# Patient Record
Sex: FEMALE | Race: WHITE | Employment: FULL TIME | URBAN - METROPOLITAN AREA
[De-identification: names, ages, dates, MRNs, and addresses within clinical notes are randomized per-mention and may not be internally consistent; named-entity substitution may affect disease eponyms.]

---

## 2017-04-22 DIAGNOSIS — G43.009 MIGRAINE WITHOUT AURA AND WITHOUT STATUS MIGRAINOSUS, NOT INTRACTABLE: ICD-10-CM

## 2017-04-25 RX ORDER — TOPIRAMATE 200 MG/1
TABLET ORAL
Qty: 90 TAB | Refills: 3 | OUTPATIENT
Start: 2017-04-25

## 2017-05-18 ENCOUNTER — TELEPHONE (OUTPATIENT)
Dept: FAMILY MEDICINE CLINIC | Age: 52
End: 2017-05-18

## 2017-05-18 NOTE — TELEPHONE ENCOUNTER
Patient is requesting (New  Updated) referral to the following office:    Speciality: GYN (Dr. Barbara Garrett has changed her schedule and therefore she would like to be recommended to someone else, prefers a woman)  Specialist Name: female gyn (willing to go to VONNIE Ray, or Cyndi jarrell)  Office Location:   Phone (if available):   Fax (if available):   Diagnosis:   Date of appointment (if scheduled): she just needs someone who can get her in before 6/15 b/c of poss job and insurance change

## 2017-05-23 NOTE — TELEPHONE ENCOUNTER
Called patient back to let her know I will send a consult request to [facility] no answer left message asking patient to call the office back. Consult request has been faxed to [facility].

## 2017-05-23 NOTE — TELEPHONE ENCOUNTER
Patient called the office back she has been told a consult request has been faxed to [facility] and someone from that office should contact her to set up an appointment asked that she call the office if she has not heard from anyone by next Friday patient expressed concern about getting in to be seen before she loses her insurance in June she has been given their telephone number asked to call them to see when they can get her in and if they are not able to get her in before the desired date to call me back and I will look for another GYN that can hopefully see her sooner she has expressed understanding.

## 2017-12-01 ENCOUNTER — OFFICE VISIT (OUTPATIENT)
Dept: FAMILY MEDICINE CLINIC | Age: 52
End: 2017-12-01

## 2017-12-01 ENCOUNTER — HOSPITAL ENCOUNTER (OUTPATIENT)
Dept: LAB | Age: 52
Discharge: HOME | End: 2017-12-01
Payer: SELF-PAY

## 2017-12-01 ENCOUNTER — TELEPHONE (OUTPATIENT)
Dept: FAMILY MEDICINE CLINIC | Age: 52
End: 2017-12-01

## 2017-12-01 VITALS
HEART RATE: 71 BPM | OXYGEN SATURATION: 99 % | BODY MASS INDEX: 26.36 KG/M2 | HEIGHT: 66 IN | SYSTOLIC BLOOD PRESSURE: 118 MMHG | RESPIRATION RATE: 15 BRPM | WEIGHT: 164 LBS | TEMPERATURE: 97.7 F | DIASTOLIC BLOOD PRESSURE: 72 MMHG

## 2017-12-01 DIAGNOSIS — E66.3 OVERWEIGHT: ICD-10-CM

## 2017-12-01 DIAGNOSIS — Z13.220 SCREENING, LIPID: ICD-10-CM

## 2017-12-01 DIAGNOSIS — G43.009 MIGRAINE WITHOUT AURA AND WITHOUT STATUS MIGRAINOSUS, NOT INTRACTABLE: Primary | ICD-10-CM

## 2017-12-01 DIAGNOSIS — Z13.1 SCREENING FOR DIABETES MELLITUS: ICD-10-CM

## 2017-12-01 DIAGNOSIS — Z12.39 SCREENING FOR BREAST CANCER: Primary | ICD-10-CM

## 2017-12-01 LAB
ALBUMIN SERPL-MCNC: 4.2 G/DL (ref 3.4–5)
ALBUMIN/GLOB SERPL: 1.3 {RATIO} (ref 0.8–1.7)
ALP SERPL-CCNC: 64 U/L (ref 45–117)
ALT SERPL-CCNC: 22 U/L (ref 13–56)
ANION GAP SERPL CALC-SCNC: 10 MMOL/L (ref 3–18)
AST SERPL-CCNC: 15 U/L (ref 15–37)
BILIRUB SERPL-MCNC: 0.5 MG/DL (ref 0.2–1)
BUN SERPL-MCNC: 20 MG/DL (ref 7–18)
BUN/CREAT SERPL: 29 (ref 12–20)
CALCIUM SERPL-MCNC: 9.2 MG/DL (ref 8.5–10.1)
CHLORIDE SERPL-SCNC: 105 MMOL/L (ref 100–108)
CHOLEST SERPL-MCNC: 223 MG/DL
CO2 SERPL-SCNC: 26 MMOL/L (ref 21–32)
CREAT SERPL-MCNC: 0.7 MG/DL (ref 0.6–1.3)
EST. AVERAGE GLUCOSE BLD GHB EST-MCNC: 114 MG/DL
GLOBULIN SER CALC-MCNC: 3.3 G/DL (ref 2–4)
GLUCOSE SERPL-MCNC: 74 MG/DL (ref 74–99)
HBA1C MFR BLD: 5.6 % (ref 4.2–5.6)
HDLC SERPL-MCNC: 66 MG/DL (ref 40–60)
HDLC SERPL: 3.4 {RATIO} (ref 0–5)
LDLC SERPL CALC-MCNC: 137.2 MG/DL (ref 0–100)
LIPID PROFILE,FLP: ABNORMAL
POTASSIUM SERPL-SCNC: 4.4 MMOL/L (ref 3.5–5.5)
PROT SERPL-MCNC: 7.5 G/DL (ref 6.4–8.2)
SODIUM SERPL-SCNC: 141 MMOL/L (ref 136–145)
TRIGL SERPL-MCNC: 99 MG/DL (ref ?–150)
TSH W FREE THYROID I,TSHELE: 1.89 UIU/ML (ref 0.36–3.74)
VLDLC SERPL CALC-MCNC: 19.8 MG/DL

## 2017-12-01 PROCEDURE — 83036 HEMOGLOBIN GLYCOSYLATED A1C: CPT | Performed by: FAMILY MEDICINE

## 2017-12-01 PROCEDURE — 80053 COMPREHEN METABOLIC PANEL: CPT | Performed by: FAMILY MEDICINE

## 2017-12-01 PROCEDURE — 80061 LIPID PANEL: CPT | Performed by: FAMILY MEDICINE

## 2017-12-01 PROCEDURE — 84443 ASSAY THYROID STIM HORMONE: CPT | Performed by: FAMILY MEDICINE

## 2017-12-01 NOTE — TELEPHONE ENCOUNTER
Mrs. John Hernandez called in stating she meant to ask Dr. Shanel Thurman for a new order for a mammogram. She is requesting a 3D Mammogram. She can be reached on her mobile [phone].

## 2017-12-01 NOTE — MR AVS SNAPSHOT
Visit Information Date & Time Provider Department Dept. Phone Encounter #  
 12/1/2017  9:00 AM Ion Latif MD [facility] [phone] [id number] Follow-up Instructions Return for results via [employer] with plan to follow. Upcoming Health Maintenance Date Due  
 PAP AKA CERVICAL CYTOLOGY 5/17/2015 Influenza Age 5 to Adult 8/1/2017 BREAST CANCER SCRN MAMMOGRAM 12/22/2018 COLONOSCOPY 4/4/2024 DTaP/Tdap/Td series (2 - Td) 12/1/2027 Allergies as of 12/1/2017  Review Complete On: 12/1/2017 By: Ion Latif MD  
  
 Severity Noted Reaction Type Reactions Gluten  05/29/2015    Nausea and Vomiting Current Immunizations  Reviewed on 3/4/2016 Name Date Influenza Vaccine 9/22/2016 Influenza Vaccine (Quad) PF 10/16/2015 TB Skin Test (PPD) Intradermal 1/18/2016 Not reviewed this visit You Were Diagnosed With   
  
 Codes Comments Migraine without aura and without status migrainosus, not intractable    -  Primary ICD-10-CM: G43.009 ICD-9-CM: 346.10 Overweight     ICD-10-CM: L70.9 ICD-9-CM: 278.02 Screening for diabetes mellitus     ICD-10-CM: Z13.1 ICD-9-CM: V77.1 Screening, lipid     ICD-10-CM: X94.251 ICD-9-CM: V77.91 Vitals BP Pulse Temp Resp Height(growth percentile) Weight(growth percentile) 118/72 71 97.7 °F (36.5 °C) 15 5' 6\" (1.676 m) 164 lb (74.4 kg) LMP SpO2 BMI OB Status Smoking Status 05/14/2017 99% 26.47 kg/m2 Having regular periods Never Smoker Vitals History BMI and BSA Data Body Mass Index Body Surface Area  
 26.47 kg/m 2 1.86 m 2 Preferred Pharmacy Pharmacy Name Phone [id number], [address] Your Updated Medication List  
  
   
This list is accurate as of: 12/1/17  9:37 AM.  Always use your most recent med list.  
  
  
  
  
 calcium citrate 200 mg (950 mg) tablet Take  by mouth daily. CLARITIN 10 mg tablet Generic drug:  loratadine Take 10 mg by mouth daily. ibuprofen 200 mg tablet Commonly known as:  MOTRIN Take 600 mg by mouth every six (6) hours as needed for Pain. IRON(SALT MIX)-ESUTR-Q-EE-14MV PO Take  by mouth. VITAMIN D3 1,000 unit Cap Generic drug:  cholecalciferol Take  by mouth. Follow-up Instructions Return for results via [employer] with plan to follow. To-Do List   
 12/01/2017 Lab:  HEMOGLOBIN A1C WITH EAG   
  
 12/01/2017 Lab:  LIPID PANEL W/ REFLX DIRECT LDL Around 12/01/2017 Lab:  METABOLIC PANEL, COMPREHENSIVE   
  
 12/01/2017 Lab:  THYROID CASCADE PROFILE Introducing [facility]! Dear Carolann Mary: 
Thank you for requesting a [employer] account. Our records indicate that you already have an active [employer] account. You can access your account anytime at [email or web address] Did you know that you can access your hospital and ER discharge instructions at any time in [employer]? You can also review all of your test results from your hospital stay or ER visit. Additional Information If you have questions, please visit the Frequently Asked Questions section of the [employer] website at [email or web address] Remember, [employer] is NOT to be used for urgent needs. For medical emergencies, dial 911. Now available from your iPhone and Android! Please provide this summary of care documentation to your next provider. Your primary care clinician is listed as Raudel Arreaga. If you have any questions after today's visit, please call [phone].

## 2017-12-01 NOTE — PROGRESS NOTES
Darrin Osorio is a 47 y.o. female    Follow-up migraine headaches had been well controlled on Topamax prophylaxis. Headaches 3-4/year last year. Stable pattern. weaned herself off the topamax over a period of two months (June-Aug) without difficulty. 6/year in the past year including that time frame  Aborting them with caffeine and ibuprofen 400 mg  Headaches shorter. 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 Very happy. Last Td in the wake of an injury couldn't raise her arm for a month. \"I'll take my chances. \"    Frustrated with weight gain. Weight Watchers had been effective in the past. Not working in the past month. Patient Care Team:  Harrison Abbott MD as PCP - General (Family Practice)  Grace Hathaway MD (Obstetrics & Gynecology)  Allergies   Allergen Reactions    Gluten Nausea and Vomiting     Outpatient Prescriptions Marked as Taking for the 12/1/17 encounter (Office Visit) with Harrison Abbott MD   Medication Sig Dispense Refill    ibuprofen (MOTRIN) 200 mg tablet Take 600 mg by mouth every six (6) hours as needed for Pain.  IRON BISGLY&PS/PRABHJOT/C/FA/MV 14 (IRON,SALT MIX,-MMWPR-J-NS-14MV PO) Take  by mouth.  calcium citrate 200 mg (950 mg) tablet Take  by mouth daily.  Cholecalciferol, Vitamin D3, (VITAMIN D3) 1,000 unit cap Take  by mouth.  loratadine (CLARITIN) 10 mg tablet Take 10 mg by mouth daily.          Patient Active Problem List    Diagnosis    Overweight    Migraine without aura and without status migrainosus, not intractable    Menorrhagia    Non-celiac gluten sensitivity     Past Medical History:   Diagnosis Date    Gluten intolerance      Past Surgical History:   Procedure Laterality Date    HX GYN  11/23/2014    endometrial ablation    HX HYSTEROSCOPY      [facility] Family History   Problem Relation Age of Onset    Heart Disease Father      passed at 62     Social History     Social History    Marital status:      Spouse name: N/A    Number of children: N/A    Years of education: N/A     Occupational History          [address] History Main Topics    Smoking status: Never Smoker    Smokeless tobacco: Never Used    Alcohol use Yes      Comment: occasionally     Drug use: No    Sexual activity: Yes     Partners: Male     Other Topics Concern    Not on file     Social History Narrative         Review of Systems   Constitutional: Negative for malaise/fatigue. Gastrointestinal: Negative for abdominal pain. Neurological: Negative for sensory change, speech change, focal weakness, seizures and loss of consciousness. Visit Vitals    /72    Pulse 71    Temp 97.7 °F (36.5 °C)    Resp 15    Ht 5' 6\" (1.676 m)    Wt 164 lb (74.4 kg)    LMP 05/14/2017    SpO2 99%    BMI 26.47 kg/m2     Physical Exam   Constitutional: She is oriented to person, place, and time. She appears well-developed. No distress. Pleasant overweight white female   HENT:   Head: Normocephalic and atraumatic. Pulmonary/Chest: Effort normal.   Neurological: She is alert and oriented to person, place, and time. No focal deficits   Psychiatric: She has a normal mood and affect. Her behavior is normal. Judgment and thought content normal.         ICD-10-CM ICD-9-CM    1. Migraine without aura and without status migrainosus, not intractable G43.009 346.10    2. Overweight E66.3 278.02 THYROID CASCADE PROFILE      HEMOGLOBIN A1C WITH EAG      LIPID PANEL W/ REFLX DIRECT LDL      METABOLIC PANEL, COMPREHENSIVE   3. Screening for diabetes mellitus Z13.1 V77.1 HEMOGLOBIN A1C WITH EAG      METABOLIC PANEL, COMPREHENSIVE   4. Screening, lipid Z13.220 V77.91 LIPID PANEL W/ REFLX DIRECT LDL       Migraines are stable well controlled.   Continue present management controlling triggering allergies with antihistamines and caffeine and OTC NSAIDs for prophylaxis. follow up prn progression of symptoms. Discussed challenges of maintaining weight with age/slowing metabolism. Caloric control is the key. While exercise important, not as effective in controlling weight. Labs to assess other contributors. Flu vaccine not available in clinic. Advised to return here to have at her pharmacy. Declining Tdap. Encouraged to seek vaccination should she be injured. Obtaining pap report. The patient understands our medical plan. Alternatives have been explained and offered. The risks, benefits and significant side effects of all medications have been reviewed. All questions have been answered. She is encouraged to employ the information provided in the after visit summary, which was reviewed. She is instructed to call the clinic if she has not been notified either by phone or through [address] with the results of her tests or with an appointment plan for any referrals within 1 week(s). No news is not good news; it's no news. The patient  is to call if her condition worsens or fails to improve or if significant side effects are experienced. Follow-up Disposition:  Return for results via [employer] with plan to follow.

## 2017-12-01 NOTE — PROGRESS NOTES
Chief Complaint   Patient presents with    Migraine     f/u. Pt reports that she weaned herself off the topamax over a period of two months and is doing 'much better'. She has had hysteroscopy done June 26. 2017. 1. Have you been to the ER, urgent care clinic since your last visit? Hospitalized since your last visit? No    2. Have you seen or consulted any other health care providers outside of the [facility] since your last visit? Include any pap smears or colon screening. Care team updated. Dr. Joe Fernandez did hysteroscopy.

## 2017-12-28 ENCOUNTER — HOSPITAL ENCOUNTER (OUTPATIENT)
Dept: MAMMOGRAPHY | Age: 52
Discharge: HOME | End: 2017-12-28
Attending: FAMILY MEDICINE
Payer: COMMERCIAL

## 2017-12-28 DIAGNOSIS — Z12.39 SCREENING FOR BREAST CANCER: ICD-10-CM

## 2017-12-28 PROCEDURE — 77063 BREAST TOMOSYNTHESIS BI: CPT

## 2018-12-28 ENCOUNTER — TELEPHONE (OUTPATIENT)
Dept: FAMILY MEDICINE CLINIC | Age: 53
End: 2018-12-28

## 2018-12-28 DIAGNOSIS — Z12.39 SCREENING FOR BREAST CANCER: Primary | ICD-10-CM

## 2018-12-28 NOTE — TELEPHONE ENCOUNTER
Patient called requesting a order for a  mammogram. Please call patient at [phone] if you have any questions.

## 2019-01-03 NOTE — TELEPHONE ENCOUNTER
Please determine whether this is a request for a screening mammogram (no masses, discharge, skin changes or other concern) in which case load the order and I'll sign    Vs prompted by a breast concern, in which case a visit is needed to determine the appropriate test.     ALBIN

## 2019-01-03 NOTE — TELEPHONE ENCOUNTER
Spoke with [address]  1965 (confirmed) on 2019 the patient stated that it is just a screening (nothing is wrong) the patient requested a 3D mammo. Told patient I would rout to Dr. Jaelyn Becker and she would be updated as soon as possible. Patient expressed understanding.     SMD

## 2019-01-03 NOTE — TELEPHONE ENCOUNTER
Called [address]  1965 on 2019  Left message requesting patient to call back at earliest convenience.     LUCINA

## 2019-02-18 ENCOUNTER — HOSPITAL ENCOUNTER (OUTPATIENT)
Dept: MAMMOGRAPHY | Age: 54
Discharge: HOME | End: 2019-02-18
Attending: FAMILY MEDICINE
Payer: COMMERCIAL

## 2019-02-18 DIAGNOSIS — Z12.39 SCREENING FOR BREAST CANCER: ICD-10-CM

## 2019-02-18 DIAGNOSIS — R92.2 INCONCLUSIVE MAMMOGRAM: Primary | ICD-10-CM

## 2019-02-18 PROCEDURE — 77063 BREAST TOMOSYNTHESIS BI: CPT

## 2019-02-18 NOTE — PROGRESS NOTES
The radiologist was not able to extract enough information from the standard mammography views by which to make a complete assessment on the right breast. Additional breast images have been recommended. This should not be considered an \"abnormal\" study. It's an incomplete study. I've ordered the requested images. Please feel free to call  [facility] Scheduling [phone] if they have not already call you.  Take good care, ALBIN

## 2019-04-22 ENCOUNTER — HOSPITAL ENCOUNTER (OUTPATIENT)
Dept: ULTRASOUND IMAGING | Age: 54
Discharge: HOME | End: 2019-04-22
Attending: FAMILY MEDICINE
Payer: COMMERCIAL

## 2019-04-22 ENCOUNTER — HOSPITAL ENCOUNTER (OUTPATIENT)
Dept: MAMMOGRAPHY | Age: 54
Discharge: HOME | End: 2019-04-22
Attending: FAMILY MEDICINE
Payer: COMMERCIAL

## 2019-04-22 DIAGNOSIS — R92.2 INCONCLUSIVE MAMMOGRAM: ICD-10-CM

## 2019-04-22 PROCEDURE — 76642 ULTRASOUND BREAST LIMITED: CPT

## 2019-04-22 PROCEDURE — 77061 BREAST TOMOSYNTHESIS UNI: CPT

## 2019-04-23 NOTE — PROGRESS NOTES
Reassuring results, Yadira Jaimes (Sammye December?). Remember that no test is perfect. Plan to be seen if you should develop any breast symptoms such as pain, skin changes or masses. Take good care.  ALBIN

## 2019-10-23 ENCOUNTER — OFFICE VISIT (OUTPATIENT)
Dept: FAMILY MEDICINE CLINIC | Age: 54
End: 2019-10-23

## 2019-10-23 VITALS
OXYGEN SATURATION: 98 % | DIASTOLIC BLOOD PRESSURE: 78 MMHG | WEIGHT: 159 LBS | HEIGHT: 66 IN | HEART RATE: 78 BPM | TEMPERATURE: 97.8 F | BODY MASS INDEX: 25.55 KG/M2 | SYSTOLIC BLOOD PRESSURE: 104 MMHG | RESPIRATION RATE: 12 BRPM

## 2019-10-23 DIAGNOSIS — M79.671 PAIN OF BOTH HEELS: ICD-10-CM

## 2019-10-23 DIAGNOSIS — M79.672 PAIN OF BOTH HEELS: ICD-10-CM

## 2019-10-23 DIAGNOSIS — L98.9 SKIN LESION: Primary | ICD-10-CM

## 2019-10-23 RX ORDER — IBUPROFEN 800 MG/1
800 TABLET ORAL
Qty: 60 TAB | Refills: 1 | Status: SHIPPED | OUTPATIENT
Start: 2019-10-23

## 2019-10-23 NOTE — PATIENT INSTRUCTIONS
Heel Pain: Care Instructions  Your Care Instructions  You can have heel pain from an injury or from everyday overuse, such as running or walking a lot. Plantar fasciitis is the most common cause of heel pain. In this condition, the bottom of your foot from the front of the heel to the base of the toes is sore and hard to walk on. Your heel can get better with rest, anti-inflammatory pain medicines, and stretching exercises. Follow-up care is a key part of your treatment and safety. Be sure to make and go to all appointments, and call your doctor if you are having problems. It's also a good idea to know your test results and keep a list of the medicines you take. How can you care for yourself at home? · Rest your feet often. Reduce your activity to a level that lets you avoid pain. If possible, do not run or walk on hard surfaces. · Take anti-inflammatory medicines to reduce heel pain. These include ibuprofen (Advil, Motrin) and naproxen (Aleve). Read and follow all instructions on the label. · Put ice or a cold pack on your heel for 10 to 20 minutes at a time. Try to do this every 1 to 2 hours for the next 3 days (when you are awake). Put a thin cloth between the ice and your skin. · If ice isn't helping after 2 or 3 days, try heat, such as a heating pad set on low. · If your doctor says it is okay, try these calf stretches. Tight calf muscles can cause heel pain or make it worse. ? Stand about 1 foot from a wall. Place the palms of both hands against the wall at chest level and lean forward against the wall. Put the leg you want to stretch about a step behind your other leg. Keep your back heel on the floor and bend your front knee until you feel a stretch in the back leg. Hold this position for 15 to 30 seconds. Repeat the exercise 2 to 4 times a session. Do 3 to 4 sessions a day. ? Sit down on the floor or a mat with your feet stretched in front of you.  Roll up a towel lengthwise, and loop it over the ball of your foot. Holding the towel at both ends, gently pull the towel toward you to stretch your foot. Hold this position for 15 to 30 seconds. Repeat the exercise 2 to 4 times a session. Do 3 to 4 sessions a day. · Wear a night splint if your doctor suggests it. A night splint holds your foot with the toes pointed up. This position gives the bottom of your foot a constant, gentle stretch. · Wear shoes with good arch support. Athletic shoes or shoes with a well-cushioned sole are good choices. · Try a heel lift, heel cup or shoe insert (orthotic) to help cushion your heel. You can buy these at many shoe stores. Use them in both shoes, even if only one foot hurts. · Maintain a healthy weight. This puts less strain on your feet. When should you call for help? Call your doctor now or seek immediate medical care if:    · You have heel pain with fever, redness, or warmth in your heel.     · You have numbness or tingling in your heel.    Watch closely for changes in your health, and be sure to contact your doctor if:    · You cannot put weight on the sore foot.     · Your heel pain lasts more than 2 weeks. Where can you learn more? Go to http://quoc-kim.info/. Enter S299 in the search box to learn more about \"Heel Pain: Care Instructions. \"  Current as of: June 26, 2019  Content Version: 12.2  © 5858-3050 [employer]. Care instructions adapted under license by [employer] (which disclaims liability or warranty for this information). If you have questions about a medical condition or this instruction, always ask your healthcare professional. [place] [address] any warranty or liability for your use of this information.

## 2019-10-23 NOTE — PROGRESS NOTES
Patient c/o skin lesions that she would like looked at. She is also c/o sharp pain in both heels, she does stand a lot, and noticed when she does not stand a lot her pain is not as bad. Medication reconciliation has been completed with patient. Care team discussed/updated as well as pharmacy.     Health Maintenance Due   Topic Date Due    Shingrix Vaccine Age 49> (1 of 2) 02/27/2014    Influenza Age 5 to Adult  08/01/2019

## 2019-10-23 NOTE — PROGRESS NOTES
Hira Sanchez is a 53 y.o. female  presents for bilateral heel pain and skin lesions. Allergies   Allergen Reactions    Gluten Nausea and Vomiting     Outpatient Medications Marked as Taking for the 10/23/19 encounter (Office Visit) with Tammy Cruz MD   Medication Sig Dispense Refill    ibuprofen (MOTRIN) 200 mg tablet Take 600 mg by mouth every six (6) hours as needed for Pain.  IRON BISGLY&PS/PRABJHOT/C/FA/MV 14 (IRON,SALT MIX,-SXOKS-I-TM-14MV PO) Take  by mouth.  calcium citrate 200 mg (950 mg) tablet Take  by mouth daily.  Cholecalciferol, Vitamin D3, (VITAMIN D3) 1,000 unit cap Take  by mouth.  loratadine (CLARITIN) 10 mg tablet Take 10 mg by mouth daily. Patient Active Problem List   Diagnosis Code    Migraine without aura and without status migrainosus, not intractable G43.009    Menorrhagia N92.0    Non-celiac gluten sensitivity K90.41    Overweight E66.3     Past Medical History:   Diagnosis Date    Gluten intolerance      Social History     Socioeconomic History    Marital status:      Spouse name: Not on file    Number of children: Not on file    Years of education: Not on file    Highest education level: Not on file   Occupational History    Occupation:      Comment: [employer]   Tobacco Use    Smoking status: Never Smoker    Smokeless tobacco: Never Used   Substance and Sexual Activity    Alcohol use: Yes     Comment: occasionally     Drug use: No    Sexual activity: Yes     Partners: Male     Family History   Problem Relation Age of Onset    Heart Disease Father         passed at 62        Review of Systems   Constitutional: Negative for chills and fever. Cardiovascular: Negative for chest pain and palpitations. Gastrointestinal: Negative for nausea and vomiting.        Vitals:    10/23/19 1557   BP: 104/78   Pulse: 78   Resp: 12   Temp: 97.8 °F (36.6 °C)   TempSrc: Oral   SpO2: 98%   Weight: 159 lb (72.1 kg) Height: 5' 6\" (1.676 m)   PainSc:   6   PainLoc: Foot       Physical Exam   Constitutional: She is oriented to person, place, and time and well-developed, well-nourished, and in no distress. Cardiovascular: Normal rate, regular rhythm and normal heart sounds. Pulmonary/Chest: Effort normal and breath sounds normal.   Neurological: She is alert and oriented to person, place, and time. Gait normal.   Skin: Skin is warm and dry. Nursing note and vitals reviewed. Assessment/Plan      ICD-10-CM ICD-9-CM    1. Skin lesion L98.9 709.9 REFERRAL TO DERMATOLOGY   2. Pain of both heels M79.671 729.5 Motrin 800 tid prn heel pain. If sxs continue will refer to podiatry. T23.880       I have discussed the diagnosis with the patient and the intended plan of care as seen in the above orders. The patient has received an after-visit summary and questions were answered concerning future plans. I have discussed medication, side effects, and warnings with the patient in detail. The patient verbalized understanding and is in agreement with the plan of care. The patient will contact the office with any additional concerns.     lab results and schedule of future lab studies reviewed with patient    Teresita Alvarado MD

## 2019-11-11 ENCOUNTER — TELEPHONE (OUTPATIENT)
Dept: FAMILY MEDICINE CLINIC | Age: 54
End: 2019-11-11

## 2019-11-11 DIAGNOSIS — M79.671 PAIN OF BOTH HEELS: Primary | ICD-10-CM

## 2019-11-11 DIAGNOSIS — M79.672 PAIN OF BOTH HEELS: Primary | ICD-10-CM

## 2019-11-11 NOTE — TELEPHONE ENCOUNTER
Patient was seen in the office by Dr. Dwight Abreu on 10/23/19 for foot pain she states this is not better and OTC Advil is not helping much. She is asking for a referral to a specialists, referral has been pended please review and sign if appropriate.

## 2019-11-11 NOTE — TELEPHONE ENCOUNTER
Patient called stating her foot/Heel is extremely painful,the advil she taking is helping a little, she is  requesting a referral,but dont have all the the information. Please call her after 4:00 at [phone]

## 2019-11-14 NOTE — TELEPHONE ENCOUNTER
Radha Alvares MD  You Yesterday (10:41 AM)     Referral signed. Routing      Called patient no answer left message letting her know her referral   Has been signed and someone from that office should be contacting her for an appt.

## 2020-01-20 ENCOUNTER — TELEPHONE (OUTPATIENT)
Dept: FAMILY MEDICINE CLINIC | Age: 55
End: 2020-01-20

## 2020-01-20 DIAGNOSIS — Z12.39 BREAST CANCER SCREENING: Primary | ICD-10-CM

## 2020-06-22 ENCOUNTER — HOSPITAL ENCOUNTER (OUTPATIENT)
Dept: MAMMOGRAPHY | Age: 55
Discharge: HOME | End: 2020-06-22
Attending: FAMILY MEDICINE
Payer: COMMERCIAL

## 2020-06-22 DIAGNOSIS — Z12.31 SCREENING MAMMOGRAM, ENCOUNTER FOR: ICD-10-CM

## 2020-06-22 PROCEDURE — 77063 BREAST TOMOSYNTHESIS BI: CPT

## 2021-07-27 ENCOUNTER — TELEPHONE (OUTPATIENT)
Dept: FAMILY MEDICINE CLINIC | Age: 56
End: 2021-07-27

## 2021-07-27 NOTE — TELEPHONE ENCOUNTER
I left a message on patient's voicemail stating that she is due for follow up and to please call the office so we can get her scheduled to come in. Dr. Shannan Wright is her PCP listed but looks like she always saw Dr. Sindy Hurtado other than last visit on 10/23/19 for an acute issue. I also stated in my message to please let us know if she transferred care so we could remove our name from her chart and not continue to bother her with reminder calls.